# Patient Record
Sex: FEMALE | Race: WHITE | NOT HISPANIC OR LATINO | URBAN - METROPOLITAN AREA
[De-identification: names, ages, dates, MRNs, and addresses within clinical notes are randomized per-mention and may not be internally consistent; named-entity substitution may affect disease eponyms.]

---

## 2020-07-25 ENCOUNTER — TELEPHONE ENCOUNTER (OUTPATIENT)
Dept: URBAN - METROPOLITAN AREA CLINIC 13 | Facility: CLINIC | Age: 25
End: 2020-07-25

## 2020-07-26 ENCOUNTER — TELEPHONE ENCOUNTER (OUTPATIENT)
Dept: URBAN - METROPOLITAN AREA CLINIC 13 | Facility: CLINIC | Age: 25
End: 2020-07-26

## 2021-02-08 ENCOUNTER — OFFICE VISIT (OUTPATIENT)
Dept: URBAN - METROPOLITAN AREA CLINIC 113 | Facility: CLINIC | Age: 26
End: 2021-02-08
Payer: COMMERCIAL

## 2021-02-08 ENCOUNTER — WEB ENCOUNTER (OUTPATIENT)
Dept: URBAN - METROPOLITAN AREA CLINIC 113 | Facility: CLINIC | Age: 26
End: 2021-02-08

## 2021-02-08 VITALS
RESPIRATION RATE: 18 BRPM | WEIGHT: 228 LBS | BODY MASS INDEX: 38.93 KG/M2 | SYSTOLIC BLOOD PRESSURE: 105 MMHG | DIASTOLIC BLOOD PRESSURE: 74 MMHG | TEMPERATURE: 98.2 F | HEART RATE: 92 BPM | HEIGHT: 64 IN

## 2021-02-08 DIAGNOSIS — K21.9 GERD: ICD-10-CM

## 2021-02-08 PROCEDURE — G8482 FLU IMMUNIZE ORDER/ADMIN: HCPCS | Performed by: NURSE PRACTITIONER

## 2021-02-08 PROCEDURE — 99244 OFF/OP CNSLTJ NEW/EST MOD 40: CPT | Performed by: NURSE PRACTITIONER

## 2021-02-08 RX ORDER — LEVONORGESTREL AND ETHINYL ESTRADIOL 0.1-0.02MG
1 TABLET FOR 21 DAYS OF EACH CYCLE KIT ORAL ONCE A DAY
Status: ACTIVE | COMMUNITY

## 2021-02-08 RX ORDER — CHOLECALCIFEROL (VITAMIN D3) 50 MCG
1 TABLET TABLET ORAL ONCE A DAY
Status: ACTIVE | COMMUNITY

## 2021-02-08 RX ORDER — VENLAFAXINE HYDROCHLORIDE 150 MG/1
1 CAPSULE WITH FOOD CAPSULE, EXTENDED RELEASE ORAL ONCE A DAY
Status: ACTIVE | COMMUNITY

## 2021-02-08 RX ORDER — LISDEXAMFETAMINE DIMESYLATE 40 MG/1
1 CAPSULE IN THE MORNING CAPSULE ORAL ONCE A DAY
Status: ACTIVE | COMMUNITY

## 2021-02-08 RX ORDER — ROPINIROLE 2 MG/1
1 TABLET 1 TO 3 HOURS BEFORE BEDTIME TABLET, FILM COATED ORAL ONCE A DAY
Status: ACTIVE | COMMUNITY

## 2021-02-08 RX ORDER — ROPINIROLE HYDROCHLORIDE 0.25 MG/1
1 TABLET 1 TO 3 HOURS BEFORE BEDTIME TABLET, FILM COATED ORAL ONCE A DAY
Status: ACTIVE | COMMUNITY

## 2021-02-08 RX ORDER — PROPRANOLOL HYDROCHLORIDE 60 MG/1
1 CAPSULE CAPSULE, EXTENDED RELEASE ORAL ONCE A DAY
Status: ACTIVE | COMMUNITY

## 2021-02-08 RX ORDER — THIAMINE HCL 100 MG
1 TABLET WITH A MEAL TABLET ORAL ONCE A DAY
Status: ACTIVE | COMMUNITY

## 2021-02-08 RX ORDER — SUMATRIPTAN SUCCINATE 100 MG/1
1 TABLET AT LEAST 2 HOURS BETWEEN DOSES AS NEEDED TABLET, FILM COATED ORAL TWICE A DAY
Status: ACTIVE | COMMUNITY

## 2021-02-08 RX ORDER — ESOMEPRAZOLE MAGNESIUM 20 MG/1
1 CAPSULE CAPSULE, DELAYED RELEASE ORAL ONCE A DAY
Status: ACTIVE | COMMUNITY

## 2021-02-08 NOTE — HPI-TODAY'S VISIT:
24yo female with a history of peptic ulcer disease, referred by Dr. Petit for evaluation of GERD. A copy of this document is being forwarded to the referring provider.   She has chronic GERD, which has been managed with daily PPI for more than 10 years. Her symptoms were controlled with Nexium 40mg daily, but she recently attempted to wean the medication in an effort to discontinue PPI entirely. Her symptoms were fairly well managed with Nexium 20mg every other day coupled with dietary modification for about two months. She only experienced symptoms with dietary indiscretion, such as following consumption of pizza, which would respond to Pepcid when needed. Unfortunately, within the last several weeks, her reflux has been worsening. She complains of daily exacerbations of postprandial and nocturnal heartburn and regurgitation despite Nexium 20mg daily. She is sleeping on a wedge pillow. She denies abdominal pain, nausea or vomiting. She reports infrequent NSAID use for management of headaches. She had an EGD many years ago by Dr. Marie, and had gastric ulcers. There is no family history of GI malignancy.

## 2021-02-19 ENCOUNTER — OFFICE VISIT (OUTPATIENT)
Dept: URBAN - METROPOLITAN AREA SURGERY CENTER 25 | Facility: SURGERY CENTER | Age: 26
End: 2021-02-19
Payer: COMMERCIAL

## 2021-02-19 DIAGNOSIS — K21.9 ACID REFLUX: ICD-10-CM

## 2021-02-19 DIAGNOSIS — K31.7 BENIGN GASTRIC POLYP: ICD-10-CM

## 2021-02-19 PROCEDURE — 43239 EGD BIOPSY SINGLE/MULTIPLE: CPT | Performed by: INTERNAL MEDICINE

## 2021-02-19 PROCEDURE — G8907 PT DOC NO EVENTS ON DISCHARG: HCPCS | Performed by: INTERNAL MEDICINE

## 2021-02-19 RX ORDER — SUMATRIPTAN SUCCINATE 100 MG/1
1 TABLET AT LEAST 2 HOURS BETWEEN DOSES AS NEEDED TABLET, FILM COATED ORAL TWICE A DAY
Status: ACTIVE | COMMUNITY

## 2021-02-19 RX ORDER — ESOMEPRAZOLE MAGNESIUM 20 MG/1
1 CAPSULE CAPSULE, DELAYED RELEASE ORAL ONCE A DAY
Status: ACTIVE | COMMUNITY

## 2021-02-19 RX ORDER — ROPINIROLE HYDROCHLORIDE 0.25 MG/1
1 TABLET 1 TO 3 HOURS BEFORE BEDTIME TABLET, FILM COATED ORAL ONCE A DAY
Status: ACTIVE | COMMUNITY

## 2021-02-19 RX ORDER — LISDEXAMFETAMINE DIMESYLATE 40 MG/1
1 CAPSULE IN THE MORNING CAPSULE ORAL ONCE A DAY
Status: ACTIVE | COMMUNITY

## 2021-02-19 RX ORDER — VENLAFAXINE HYDROCHLORIDE 150 MG/1
1 CAPSULE WITH FOOD CAPSULE, EXTENDED RELEASE ORAL ONCE A DAY
Status: ACTIVE | COMMUNITY

## 2021-02-19 RX ORDER — CHOLECALCIFEROL (VITAMIN D3) 50 MCG
1 TABLET TABLET ORAL ONCE A DAY
Status: ACTIVE | COMMUNITY

## 2021-02-19 RX ORDER — LEVONORGESTREL AND ETHINYL ESTRADIOL 0.1-0.02MG
1 TABLET FOR 21 DAYS OF EACH CYCLE KIT ORAL ONCE A DAY
Status: ACTIVE | COMMUNITY

## 2021-02-19 RX ORDER — PROPRANOLOL HYDROCHLORIDE 60 MG/1
1 CAPSULE CAPSULE, EXTENDED RELEASE ORAL ONCE A DAY
Status: ACTIVE | COMMUNITY

## 2021-02-19 RX ORDER — THIAMINE HCL 100 MG
1 TABLET WITH A MEAL TABLET ORAL ONCE A DAY
Status: ACTIVE | COMMUNITY

## 2021-02-19 RX ORDER — ROPINIROLE 2 MG/1
1 TABLET 1 TO 3 HOURS BEFORE BEDTIME TABLET, FILM COATED ORAL ONCE A DAY
Status: ACTIVE | COMMUNITY

## 2021-03-30 ENCOUNTER — OFFICE VISIT (OUTPATIENT)
Dept: URBAN - METROPOLITAN AREA CLINIC 113 | Facility: CLINIC | Age: 26
End: 2021-03-30
Payer: COMMERCIAL

## 2021-03-30 VITALS
BODY MASS INDEX: 37.73 KG/M2 | HEART RATE: 93 BPM | DIASTOLIC BLOOD PRESSURE: 73 MMHG | WEIGHT: 221 LBS | SYSTOLIC BLOOD PRESSURE: 102 MMHG | HEIGHT: 64 IN | TEMPERATURE: 97.3 F

## 2021-03-30 DIAGNOSIS — K21.9 GERD WITHOUT ESOPHAGITIS: ICD-10-CM

## 2021-03-30 DIAGNOSIS — K44.9 DIAPHRAGMATIC HERNIA WITHOUT OBSTRUCTION OR GANGRENE: ICD-10-CM

## 2021-03-30 PROBLEM — 371132002: Status: ACTIVE | Noted: 2021-03-30

## 2021-03-30 PROCEDURE — 99214 OFFICE O/P EST MOD 30 MIN: CPT | Performed by: INTERNAL MEDICINE

## 2021-03-30 RX ORDER — ROPINIROLE 2 MG/1
1 TABLET 1 TO 3 HOURS BEFORE BEDTIME TABLET, FILM COATED ORAL ONCE A DAY
Status: ACTIVE | COMMUNITY

## 2021-03-30 RX ORDER — PROPRANOLOL HYDROCHLORIDE 60 MG/1
1 CAPSULE CAPSULE, EXTENDED RELEASE ORAL ONCE A DAY
Status: ACTIVE | COMMUNITY

## 2021-03-30 RX ORDER — VENLAFAXINE HYDROCHLORIDE 150 MG/1
1 CAPSULE WITH FOOD CAPSULE, EXTENDED RELEASE ORAL ONCE A DAY
Status: ACTIVE | COMMUNITY

## 2021-03-30 RX ORDER — THIAMINE HCL 100 MG
1 TABLET WITH A MEAL TABLET ORAL ONCE A DAY
Status: ACTIVE | COMMUNITY

## 2021-03-30 RX ORDER — LEVONORGESTREL AND ETHINYL ESTRADIOL 0.1-0.02MG
1 TABLET FOR 21 DAYS OF EACH CYCLE KIT ORAL ONCE A DAY
Status: ACTIVE | COMMUNITY

## 2021-03-30 RX ORDER — ESOMEPRAZOLE MAGNESIUM 40 MG/1
1 CAPSULE CAPSULE, DELAYED RELEASE ORAL ONCE A DAY
Qty: 30 | OUTPATIENT
Start: 2021-03-30

## 2021-03-30 RX ORDER — SUMATRIPTAN SUCCINATE 100 MG/1
1 TABLET AT LEAST 2 HOURS BETWEEN DOSES AS NEEDED TABLET, FILM COATED ORAL TWICE A DAY
Status: ACTIVE | COMMUNITY

## 2021-03-30 RX ORDER — ESOMEPRAZOLE MAGNESIUM 20 MG/1
1 CAPSULE CAPSULE, DELAYED RELEASE ORAL ONCE A DAY
Status: ACTIVE | COMMUNITY

## 2021-03-30 RX ORDER — ROPINIROLE HYDROCHLORIDE 0.25 MG/1
1 TABLET 1 TO 3 HOURS BEFORE BEDTIME TABLET, FILM COATED ORAL ONCE A DAY
Status: ACTIVE | COMMUNITY

## 2021-03-30 RX ORDER — CHOLECALCIFEROL (VITAMIN D3) 50 MCG
1 TABLET TABLET ORAL ONCE A DAY
Status: ACTIVE | COMMUNITY

## 2021-03-30 RX ORDER — FAMOTIDINE 40 MG/1
1 TABLET AT BEDTIME TABLET, FILM COATED ORAL ONCE A DAY
Qty: 30 | OUTPATIENT
Start: 2021-03-30

## 2021-03-30 RX ORDER — LISDEXAMFETAMINE DIMESYLATE 40 MG/1
1 CAPSULE IN THE MORNING CAPSULE ORAL ONCE A DAY
Status: ACTIVE | COMMUNITY

## 2021-03-30 NOTE — HPI-TODAY'S VISIT:
Ms Childers is a 26yo female with a history of peptic ulcer disease initially referred by Dr. Petit for evaluation of GERD.   EGD Performed 2/19/21 Revealed a normal esophagus, no grade 2 gastroesophageal valve, 2 cm hiatal hernia, otherwise normal esophagus, mild nonerosive gastritis, fundic gland polyps and normal duodenum.  Esophageal biopsies were negative for eosinophilic esophagitis and revealed reflux esophagitis, gastric biopsies were negative for H. pylori.  She increased her Nexium to 40mg daily since the procedure.  She has been on Nexium 20mg for at least 10 years or more.  Her heartburn and reflux are fairly well controlled and denies breakthrough symptoms.  She has occasional regurgitation.  She has been having post-prandial chest tightness mainly after dinner.  She will also have difficulty catching her breath at the same time.  She eats dinner around 8 and lays down around 12am.  She otherwise denies abdominal pain, nausea, vomiting, blood in the stool or unintentional weight loss.  She has lost 15lbs with dieting, but is also dealing with a binge eating disorder.  She is currently following  [employer] diet plan.

## 2021-04-30 ENCOUNTER — ERX REFILL RESPONSE (OUTPATIENT)
Dept: URBAN - METROPOLITAN AREA CLINIC 113 | Facility: CLINIC | Age: 26
End: 2021-04-30

## 2021-04-30 RX ORDER — ESOMEPRAZOLE MAGNESIUM 40 MG/1
1 CAPSULE CAPSULE, DELAYED RELEASE ORAL ONCE A DAY
Qty: 30 | Refills: 0

## 2021-04-30 RX ORDER — FAMOTIDINE 40 MG/1
1 TABLET AT BEDTIME TABLET, FILM COATED ORAL ONCE A DAY
Qty: 30 | Refills: 0

## 2021-05-14 ENCOUNTER — DASHBOARD ENCOUNTERS (OUTPATIENT)
Age: 26
End: 2021-05-14

## 2021-05-14 ENCOUNTER — OFFICE VISIT (OUTPATIENT)
Dept: URBAN - METROPOLITAN AREA CLINIC 113 | Facility: CLINIC | Age: 26
End: 2021-05-14
Payer: COMMERCIAL

## 2021-05-14 ENCOUNTER — WEB ENCOUNTER (OUTPATIENT)
Dept: URBAN - METROPOLITAN AREA CLINIC 113 | Facility: CLINIC | Age: 26
End: 2021-05-14

## 2021-05-14 VITALS
HEIGHT: 64 IN | BODY MASS INDEX: 37.22 KG/M2 | WEIGHT: 218 LBS | HEART RATE: 121 BPM | DIASTOLIC BLOOD PRESSURE: 81 MMHG | TEMPERATURE: 97.3 F | SYSTOLIC BLOOD PRESSURE: 115 MMHG

## 2021-05-14 DIAGNOSIS — K21.9 GERD WITHOUT ESOPHAGITIS: ICD-10-CM

## 2021-05-14 DIAGNOSIS — K44.9 DIAPHRAGMATIC HERNIA WITHOUT OBSTRUCTION OR GANGRENE: ICD-10-CM

## 2021-05-14 PROBLEM — 235595009: Status: ACTIVE | Noted: 2021-03-30

## 2021-05-14 PROBLEM — 39839004: Status: ACTIVE | Noted: 2021-03-30

## 2021-05-14 PROBLEM — 266435005: Status: ACTIVE | Noted: 2021-03-30

## 2021-05-14 PROCEDURE — 99214 OFFICE O/P EST MOD 30 MIN: CPT | Performed by: INTERNAL MEDICINE

## 2021-05-14 RX ORDER — THIAMINE HCL 100 MG
1 TABLET WITH A MEAL TABLET ORAL ONCE A DAY
Status: ACTIVE | COMMUNITY

## 2021-05-14 RX ORDER — ESOMEPRAZOLE MAGNESIUM 40 MG/1
1 CAPSULE CAPSULE, DELAYED RELEASE ORAL ONCE A DAY
Qty: 30 | Refills: 0 | Status: ACTIVE | COMMUNITY

## 2021-05-14 RX ORDER — ROPINIROLE 2 MG/1
1 TABLET 1 TO 3 HOURS BEFORE BEDTIME TABLET, FILM COATED ORAL ONCE A DAY
Status: ACTIVE | COMMUNITY

## 2021-05-14 RX ORDER — ROPINIROLE HYDROCHLORIDE 0.25 MG/1
1 TABLET 1 TO 3 HOURS BEFORE BEDTIME TABLET, FILM COATED ORAL ONCE A DAY
Status: ON HOLD | COMMUNITY

## 2021-05-14 RX ORDER — CHOLECALCIFEROL (VITAMIN D3) 50 MCG
1 TABLET TABLET ORAL ONCE A DAY
Status: ACTIVE | COMMUNITY

## 2021-05-14 RX ORDER — LISDEXAMFETAMINE DIMESYLATE 40 MG/1
1 CAPSULE IN THE MORNING CAPSULE ORAL ONCE A DAY
Status: ACTIVE | COMMUNITY

## 2021-05-14 RX ORDER — FAMOTIDINE 40 MG/1
1 TABLET AT BEDTIME TABLET, FILM COATED ORAL ONCE A DAY
Qty: 30 | OUTPATIENT

## 2021-05-14 RX ORDER — PROPRANOLOL HYDROCHLORIDE 60 MG/1
1 CAPSULE CAPSULE, EXTENDED RELEASE ORAL ONCE A DAY
Status: ACTIVE | COMMUNITY

## 2021-05-14 RX ORDER — ESOMEPRAZOLE MAGNESIUM 40 MG/1
1 CAPSULE CAPSULE, DELAYED RELEASE ORAL ONCE A DAY
Qty: 90 | Refills: 3 | OUTPATIENT

## 2021-05-14 RX ORDER — FAMOTIDINE 40 MG/1
1 TABLET AT BEDTIME TABLET, FILM COATED ORAL ONCE A DAY
Qty: 30 | Refills: 0 | Status: ACTIVE | COMMUNITY

## 2021-05-14 RX ORDER — LEVONORGESTREL AND ETHINYL ESTRADIOL 0.1-0.02MG
1 TABLET FOR 21 DAYS OF EACH CYCLE KIT ORAL ONCE A DAY
Status: ACTIVE | COMMUNITY

## 2021-05-14 RX ORDER — VENLAFAXINE HYDROCHLORIDE 150 MG/1
1 CAPSULE WITH FOOD CAPSULE, EXTENDED RELEASE ORAL ONCE A DAY
Status: ACTIVE | COMMUNITY

## 2021-05-14 RX ORDER — ESOMEPRAZOLE MAGNESIUM 20 MG/1
1 CAPSULE CAPSULE, DELAYED RELEASE ORAL ONCE A DAY
Status: ON HOLD | COMMUNITY

## 2021-05-14 RX ORDER — SUMATRIPTAN SUCCINATE 100 MG/1
1 TABLET AT LEAST 2 HOURS BETWEEN DOSES AS NEEDED TABLET, FILM COATED ORAL TWICE A DAY
Status: ACTIVE | COMMUNITY

## 2021-05-14 NOTE — HPI-TODAY'S VISIT:
Ms Childers is a 24 yo female here for follow up and further management of GERD.  Her PCP is Dr. Petit.    EGD Performed 2/19/21 Revealed a normal esophagus, hill grade 2 gastroesophageal valve, 2 cm hiatal hernia, otherwise normal esophagus, mild nonerosive gastritis, fundic gland polyps and normal duodenum.  Esophageal biopsies were negative for eosinophilic esophagitis and revealed reflux esophagitis, gastric biopsies were negative for H. pylori.   After her last office visit she increase her Nexium to 40 mg daily for the last few months.  Her symptoms are well-controlled and rarely has episodes of breakthrough GERD.  She rarely uses Pepcid any longer.  She denies nausea, vomiting, dysphagia, and abdominal pain or weight loss.  She is still on the [employer] diet plan, but her weight has not changed much in the last few months.  She initially lost 15 pounds.  She is exercising around twice a week.

## 2021-06-23 ENCOUNTER — ERX REFILL RESPONSE (OUTPATIENT)
Dept: URBAN - METROPOLITAN AREA CLINIC 113 | Facility: CLINIC | Age: 26
End: 2021-06-23

## 2021-06-23 RX ORDER — FAMOTIDINE 40 MG/1
1 TABLET AT BEDTIME TABLET, FILM COATED ORAL ONCE A DAY
Qty: 30 | Refills: 1

## 2022-05-09 ENCOUNTER — ERX REFILL RESPONSE (OUTPATIENT)
Dept: URBAN - METROPOLITAN AREA CLINIC 113 | Facility: CLINIC | Age: 27
End: 2022-05-09

## 2022-05-09 RX ORDER — ESOMEPRAZOLE MAGNESIUM 40 MG/1
TAKE 1 CAPSULE BY MOUTH EVERY DAY CAPSULE, DELAYED RELEASE ORAL
Qty: 90 CAPSULE | Refills: 1 | OUTPATIENT

## 2022-05-09 RX ORDER — ESOMEPRAZOLE MAGNESIUM 40 MG/1
1 CAPSULE CAPSULE, DELAYED RELEASE ORAL ONCE A DAY
Qty: 90 | Refills: 3 | OUTPATIENT

## 2022-11-07 ENCOUNTER — ERX REFILL RESPONSE (OUTPATIENT)
Dept: URBAN - METROPOLITAN AREA CLINIC 113 | Facility: CLINIC | Age: 27
End: 2022-11-07

## 2022-11-07 RX ORDER — ESOMEPRAZOLE MAGNESIUM 40 MG/1
TAKE 1 CAPSULE BY MOUTH EVERY DAY CAPSULE, DELAYED RELEASE ORAL
Qty: 90 CAPSULE | Refills: 1 | OUTPATIENT

## 2022-11-07 RX ORDER — ESOMEPRAZOLE MAGNESIUM 40 MG/1
TAKE 1 CAPSULE BY MOUTH EVERY DAY CAPSULE, DELAYED RELEASE ORAL
Qty: 90 CAPSULE | Refills: 3 | OUTPATIENT